# Patient Record
Sex: FEMALE | Race: WHITE | NOT HISPANIC OR LATINO | URBAN - NONMETROPOLITAN AREA
[De-identification: names, ages, dates, MRNs, and addresses within clinical notes are randomized per-mention and may not be internally consistent; named-entity substitution may affect disease eponyms.]

---

## 2017-02-09 ENCOUNTER — OFFICE VISIT (OUTPATIENT)
Dept: RETAIL CLINIC | Facility: CLINIC | Age: 27
End: 2017-02-09

## 2017-02-09 DIAGNOSIS — Z13.9 SCREENING: Primary | ICD-10-CM

## 2017-02-09 NOTE — PROGRESS NOTES
Subjective   Sophie Villanueva is a 26 y.o. female.     Patient presents to clinic for an E-Screen. See scanned documents.     Diagnosis for this visit:  Screening Z13.9      Follow-up with employer results.     Comfort Noriega, APRN